# Patient Record
(demographics unavailable — no encounter records)

---

## 2025-04-17 NOTE — HISTORY OF PRESENT ILLNESS
[Y] : Positive pregnancy history [___] : #1 ([unfilled]): [Pregnancy History] : girl [TextBox_4] : 60-year-old  postmenopausal, she denies any vaginal bleeding. [Mammogramdate] : 2/7/2024 [PGHxTotal] : 2 [[facility] : 1 [[facility] Hospitaliving] : 1 [PGHxABSpont] : 1

## 2025-04-17 NOTE — PHYSICAL EXAM
[Chaperoned Physical Exam] : A chaperone was present in the examining room during all aspects of the physical examination. [MA] : MA [Examination Of The Breasts] : a normal appearance [No Masses] : no breast masses were palpable [Labia Majora] : normal [Labia Minora] : normal [Normal] : normal [Uterine Adnexae] : non-palpable [FreeTextEntry2] : Tere